# Patient Record
Sex: MALE | Race: WHITE | NOT HISPANIC OR LATINO | Employment: UNEMPLOYED | URBAN - NONMETROPOLITAN AREA
[De-identification: names, ages, dates, MRNs, and addresses within clinical notes are randomized per-mention and may not be internally consistent; named-entity substitution may affect disease eponyms.]

---
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
## 2022-03-03 ENCOUNTER — HOSPITAL ENCOUNTER (EMERGENCY)
Facility: HOSPITAL | Age: 22
Discharge: LEFT BEFORE DISCHARGE | End: 2022-03-03
Admitting: EMERGENCY MEDICINE

## 2022-03-03 VITALS
TEMPERATURE: 98.3 F | HEART RATE: 122 BPM | BODY MASS INDEX: 18.83 KG/M2 | SYSTOLIC BLOOD PRESSURE: 136 MMHG | DIASTOLIC BLOOD PRESSURE: 79 MMHG | OXYGEN SATURATION: 99 % | HEIGHT: 67 IN | RESPIRATION RATE: 20 BRPM | WEIGHT: 120 LBS

## 2022-03-03 PROCEDURE — 99281 EMR DPT VST MAYX REQ PHY/QHP: CPT

## 2022-03-04 ENCOUNTER — HOSPITAL ENCOUNTER (EMERGENCY)
Facility: HOSPITAL | Age: 22
Discharge: HOME | End: 2022-03-05
Attending: EMERGENCY MEDICINE | Admitting: EMERGENCY MEDICINE

## 2022-03-04 ENCOUNTER — HOSPITAL ENCOUNTER (EMERGENCY)
Dept: HOSPITAL 79 - ER1 | Age: 22
Discharge: HOME | End: 2022-03-04
Payer: COMMERCIAL

## 2022-03-04 DIAGNOSIS — R10.84: Primary | ICD-10-CM

## 2022-03-04 DIAGNOSIS — F12.10 CANNABIS ABUSE WITHOUT COMPLICATION: Primary | ICD-10-CM

## 2022-03-04 DIAGNOSIS — R11.2: ICD-10-CM

## 2022-03-04 LAB
BASOPHILS # BLD AUTO: 0.04 10*3/MM3 (ref 0–0.2)
BASOPHILS NFR BLD AUTO: 0.5 % (ref 0–1.5)
BUN/CREATININE RATIO: 10 (ref 0–10)
DEPRECATED RDW RBC AUTO: 39.5 FL (ref 37–54)
EOSINOPHIL # BLD AUTO: 0 10*3/MM3 (ref 0–0.4)
EOSINOPHIL NFR BLD AUTO: 0 % (ref 0.3–6.2)
ERYTHROCYTE [DISTWIDTH] IN BLOOD BY AUTOMATED COUNT: 11.6 % (ref 12.3–15.4)
HCT VFR BLD AUTO: 41.1 % (ref 37.5–51)
HGB BLD-MCNC: 13.9 G/DL (ref 13–17.7)
HGB BLD-MCNC: 14.8 GM/DL (ref 14–17.5)
IMM GRANULOCYTES # BLD AUTO: 0.02 10*3/MM3 (ref 0–0.05)
IMM GRANULOCYTES NFR BLD AUTO: 0.3 % (ref 0–0.5)
LYMPHOCYTES # BLD AUTO: 1.56 10*3/MM3 (ref 0.7–3.1)
LYMPHOCYTES NFR BLD AUTO: 20.5 % (ref 19.6–45.3)
MCH RBC QN AUTO: 31.1 PG (ref 26.6–33)
MCHC RBC AUTO-ENTMCNC: 33.8 G/DL (ref 31.5–35.7)
MCV RBC AUTO: 91.9 FL (ref 79–97)
MONOCYTES # BLD AUTO: 0.65 10*3/MM3 (ref 0.1–0.9)
MONOCYTES NFR BLD AUTO: 8.6 % (ref 5–12)
NEUTROPHILS NFR BLD AUTO: 5.33 10*3/MM3 (ref 1.7–7)
NEUTROPHILS NFR BLD AUTO: 70.1 % (ref 42.7–76)
NRBC BLD AUTO-RTO: 0 /100 WBC (ref 0–0.2)
PLATELET # BLD AUTO: 266 10*3/MM3 (ref 140–450)
PMV BLD AUTO: 10.6 FL (ref 6–12)
RBC # BLD AUTO: 4.47 10*6/MM3 (ref 4.14–5.8)
RED BLOOD COUNT: 4.79 M/UL (ref 4.2–5.5)
WBC NRBC COR # BLD: 7.6 10*3/MM3 (ref 3.4–10.8)
WHITE BLOOD COUNT: 7.4 K/UL (ref 4.5–11)

## 2022-03-04 PROCEDURE — 80053 COMPREHEN METABOLIC PANEL: CPT | Performed by: PHYSICIAN ASSISTANT

## 2022-03-04 PROCEDURE — 87636 SARSCOV2 & INF A&B AMP PRB: CPT | Performed by: PHYSICIAN ASSISTANT

## 2022-03-04 PROCEDURE — 83690 ASSAY OF LIPASE: CPT | Performed by: PHYSICIAN ASSISTANT

## 2022-03-04 PROCEDURE — 85025 COMPLETE CBC W/AUTO DIFF WBC: CPT | Performed by: PHYSICIAN ASSISTANT

## 2022-03-04 PROCEDURE — C9803 HOPD COVID-19 SPEC COLLECT: HCPCS

## 2022-03-04 PROCEDURE — 83605 ASSAY OF LACTIC ACID: CPT | Performed by: PHYSICIAN ASSISTANT

## 2022-03-04 PROCEDURE — 99283 EMERGENCY DEPT VISIT LOW MDM: CPT

## 2022-03-04 PROCEDURE — 86140 C-REACTIVE PROTEIN: CPT | Performed by: PHYSICIAN ASSISTANT

## 2022-03-04 PROCEDURE — 36415 COLL VENOUS BLD VENIPUNCTURE: CPT

## 2022-03-04 NOTE — ED NOTES
"Patient was assessed in lobby by [facility]. Upon explanation of plan of care with patient, patient advised provider he was not going to get his blood drawn as he was scared of needles. Provider explained process of blood draw and short duration of procedure. Patient stated \"no, I'll just leave and try again another time.\" Provider explained to patient that patient will need to have blood drawn to assess cause of pain upon future evaluation. Patient ambulated from facility and refused signing AMA form. [facility] CHRISTIE.     CC: Abdominal Pain  Arrival: 2243  Triage complete: 2248  Left AMA due to wanting labs drawn.      Martin Sheikh, RN  03/03/22 8050    "

## 2022-03-04 NOTE — ED NOTES
MEDICAL SCREENING:    Reason for Visit: Abdominal pain    Patient initially seen in triage.  The patient was advised further evaluation and diagnostic testing will be needed, some of the treatment and testing will be initiated in the lobby in order to begin the process.  The patient will be returned to the waiting area for the time being and possibly be re-assessed by a subsequent ED provider.  The patient will be brought back to the treatment area in as timely manner as possible.       Quita Sy, APRN  03/03/22 2503

## 2022-03-05 ENCOUNTER — APPOINTMENT (OUTPATIENT)
Dept: CT IMAGING | Facility: HOSPITAL | Age: 22
End: 2022-03-05

## 2022-03-05 VITALS
WEIGHT: 120 LBS | BODY MASS INDEX: 18.83 KG/M2 | TEMPERATURE: 97.9 F | SYSTOLIC BLOOD PRESSURE: 125 MMHG | HEART RATE: 62 BPM | OXYGEN SATURATION: 98 % | DIASTOLIC BLOOD PRESSURE: 81 MMHG | HEIGHT: 67 IN | RESPIRATION RATE: 20 BRPM

## 2022-03-05 LAB
ALBUMIN SERPL-MCNC: 4.84 G/DL (ref 3.5–5.2)
ALBUMIN/GLOB SERPL: 1.7 G/DL
ALP SERPL-CCNC: 44 U/L (ref 39–117)
ALT SERPL W P-5'-P-CCNC: 19 U/L (ref 1–41)
AMPHET+METHAMPHET UR QL: NEGATIVE
AMPHETAMINES UR QL: NEGATIVE
ANION GAP SERPL CALCULATED.3IONS-SCNC: 11.9 MMOL/L (ref 5–15)
AST SERPL-CCNC: 14 U/L (ref 1–40)
BARBITURATES UR QL SCN: NEGATIVE
BENZODIAZ UR QL SCN: NEGATIVE
BILIRUB SERPL-MCNC: 0.6 MG/DL (ref 0–1.2)
BILIRUB UR QL STRIP: NEGATIVE
BUN SERPL-MCNC: 6 MG/DL (ref 6–20)
BUN/CREAT SERPL: 7 (ref 7–25)
BUPRENORPHINE SERPL-MCNC: NEGATIVE NG/ML
CALCIUM SPEC-SCNC: 9.8 MG/DL (ref 8.6–10.5)
CANNABINOIDS SERPL QL: POSITIVE
CHLORIDE SERPL-SCNC: 101 MMOL/L (ref 98–107)
CLARITY UR: CLEAR
CO2 SERPL-SCNC: 26.1 MMOL/L (ref 22–29)
COCAINE UR QL: NEGATIVE
COLOR UR: YELLOW
CREAT SERPL-MCNC: 0.86 MG/DL (ref 0.76–1.27)
CRP SERPL-MCNC: 3.7 MG/DL (ref 0–0.5)
D-LACTATE SERPL-SCNC: 1.1 MMOL/L (ref 0.5–2)
EGFRCR SERPLBLD CKD-EPI 2021: 127.9 ML/MIN/1.73
FLUAV RNA RESP QL NAA+PROBE: NOT DETECTED
FLUBV RNA RESP QL NAA+PROBE: NOT DETECTED
GLOBULIN UR ELPH-MCNC: 2.9 GM/DL
GLUCOSE SERPL-MCNC: 109 MG/DL (ref 65–99)
GLUCOSE UR STRIP-MCNC: NEGATIVE MG/DL
HGB UR QL STRIP.AUTO: NEGATIVE
HOLD SPECIMEN: NORMAL
HOLD SPECIMEN: NORMAL
KETONES UR QL STRIP: NEGATIVE
LEUKOCYTE ESTERASE UR QL STRIP.AUTO: NEGATIVE
LIPASE SERPL-CCNC: 12 U/L (ref 13–60)
METHADONE UR QL SCN: NEGATIVE
NITRITE UR QL STRIP: NEGATIVE
OPIATES UR QL: NEGATIVE
OXYCODONE UR QL SCN: NEGATIVE
PCP UR QL SCN: NEGATIVE
PH UR STRIP.AUTO: 7 [PH] (ref 5–8)
POTASSIUM SERPL-SCNC: 3.9 MMOL/L (ref 3.5–5.2)
PROPOXYPH UR QL: NEGATIVE
PROT SERPL-MCNC: 7.7 G/DL (ref 6–8.5)
PROT UR QL STRIP: NEGATIVE
SARS-COV-2 RNA RESP QL NAA+PROBE: NOT DETECTED
SODIUM SERPL-SCNC: 139 MMOL/L (ref 136–145)
SP GR UR STRIP: 1.01 (ref 1–1.03)
TRICYCLICS UR QL SCN: NEGATIVE
UROBILINOGEN UR QL STRIP: NORMAL
WHOLE BLOOD HOLD SPECIMEN: NORMAL
WHOLE BLOOD HOLD SPECIMEN: NORMAL

## 2022-03-05 PROCEDURE — 81003 URINALYSIS AUTO W/O SCOPE: CPT | Performed by: PHYSICIAN ASSISTANT

## 2022-03-05 PROCEDURE — 80306 DRUG TEST PRSMV INSTRMNT: CPT | Performed by: PHYSICIAN ASSISTANT

## 2022-03-05 PROCEDURE — 74176 CT ABD & PELVIS W/O CONTRAST: CPT

## 2022-03-05 RX ORDER — PROMETHAZINE HYDROCHLORIDE 25 MG/1
25 TABLET ORAL EVERY 6 HOURS PRN
Qty: 20 TABLET | Refills: 0 | Status: SHIPPED | OUTPATIENT
Start: 2022-03-05 | End: 2022-03-10

## 2022-03-05 NOTE — ED NOTES
MEDICAL SCREENING:    Reason for Visit: Abdominal pain, n/v x 4 days    Patient initially seen in triage.  The patient was advised further evaluation and diagnostic testing will be needed, some of the treatment and testing will be initiated in the lobby in order to begin the process.  The patient will be returned to the waiting area for the time being and possibly be re-assessed by a subsequent ED provider.  The patient will be brought back to the treatment area in as timely manner as possible.         Ivy Mantilla PA-C  03/04/22 4525

## 2022-03-05 NOTE — ED NOTES
Pt reassessed at this time, pt has no new complaints. Apologized to pt for wait times. Pt has NADN at this time. Will continue to monitor pt.        Cynthia Taylor RN  03/05/22 0459

## 2022-03-05 NOTE — ED NOTES
Pt reassessed at this time, pt has no new complaints. Apologized to pt for wait times. Pt has NADN at this time. Will continue to monitor pt.        Cynthia Taylor RN  03/05/22 0052

## 2022-03-05 NOTE — ED NOTES
Pt reassessed at this time, pt has no new complaints. Apologized to pt for wait times. Pt has NADN at this time. Will continue to monitor pt.        Cynthia Taylor RN  03/05/22 0689

## 2022-03-05 NOTE — EXTERNAL PATIENT INSTRUCTIONS
Patient Education   Table of Contents       Cannabinoid Hyperemesis Syndrome       Substance Use Disorder     To view videos and all your education online visit,   [email or web address]   or scan this QR code with your smartphone.                  Cannabinoid Hyperemesis Syndrome     Cannabinoid hyperemesis syndrome (CHS) is a condition that causes repeated nausea, vomiting, and abdominal pain after long-term (chronic) use of marijuana (cannabis). People with CHS typically use marijuana 3?5 times a day for many years before they have symptoms, although it is possible to develop CHS with far less daily use.   Symptoms of CHS may be mild at first but can get worse and more frequent. In some cases, CHS may cause severe daily vomiting, which can lead to weight loss and dehydration.   What are the causes?   The exact cause of this condition is not known. Long-term use of marijuana may over-stimulate certain proteins in the brain and gastrointestinal tract that react with chemicals in marijuana (cannabinoid receptors). This over-stimulation may cause CHS.   What are the signs or symptoms?    Symptoms of this condition are often mild during the first few episodes, but they can get worse over time. Symptoms may include:       Frequent nausea, especially early in the morning.       Vomiting. This can become severe.       Abdominal pain.       Feeling very tired (lethargic).       Headaches.     CHS may go away and come back many times (recur). People may not have symptoms or may otherwise be healthy in between CHS episodes. Taking hot showers can relieve the symptoms of CHS, so feeling the need to take several hot showers throughout the day can be a sign of this condition.   How is this diagnosed?    This condition may be diagnosed based on:       Your symptoms and medical history, including any drug use.       A physical exam.      You may have tests done to rule out other problems that could cause your symptoms.  These tests may include:       Blood tests.       Urine tests.       Imaging tests, such as an X-ray or a CT scan.     How is this treated?    Treatment for this condition involves stopping marijuana use. Treatment may include:       A drug rehabilitation program, if you have trouble stopping marijuana use.       Medicines for nausea. These may be given at the hospital through an IV inserted into one of your veins, or they may be medicines that you take by mouth (orally).       Certain creams that contain a substance called capsaicin. These may improve symptoms when applied to the abdomen.       Hot showers to help relieve symptoms.     In severe cases, you may need treatment at a hospital. You may be given IV fluids to prevent or treat dehydration as well as medicines to treat nausea, vomiting, and pain.   Follow these instructions at home:   During an episode of CHS            Stay in bed and rest in a dark, quiet room.       Take anti-nausea medicine as told by your health care provider.       Try taking hot showers to relieve your symptoms.     After an episode of CHS         Drink small amounts of clear fluids slowly. Gradually add more if you can keep the fluids down without vomiting.       Once you are able to eat without vomiting, eat soft foods in small amounts every 3?4 hours.     General instructions           Do not  use any products that contain marijuana.If you need help quitting, ask your health care provider for resources and treatment options.       Drink enough fluid to keep your urine pale yellow. Avoid drinking fluids that have a lot of sugar or caffeine, such as coffee and soda.       Take and apply over-the-counter and prescription medicines only as told by your health care provider. Ask your health care provider before starting any new medicines or treatments.       Keep all follow-up visits as told by your health care provider. This is important. This includes any recommended substance abuse  programs.         Contact a health care provider if:         Your symptoms get worse.       You cannot drink fluids without vomiting or severe pain.       You have pain and trouble swallowing after an episode.     Get help right away if you:         Cannot stop vomiting.       Have blood in your vomit or your vomit looks like coffee grounds.       Have severe abdominal pain.       Have stools that are bloody or black, or stools that look like tar.      Have symptoms of dehydration, such as:       Sunken eyes.       Inability to make tears.       Cracked lips.       Dry mouth.       Decreased urine production.       Weakness.       Sleepiness.       Dizziness, light-headedness, or fainting.     Summary         Cannabinoid hyperemesis syndrome (CHS) is a condition that causes repeated nausea, vomiting, and abdominal pain after long-term use of marijuana.       People with CHS typically use marijuana 3?5 times a day for many years before they have symptoms, although it is possible to develop CHS with much less daily use.       Treatment for this condition involves stopping marijuana use. Hot showers and capsaicin creams may also help relieve symptoms. Ask your health care provider before starting any medicines or other treatments.       Your health care provider may prescribe medicines to help with nausea.       Get help right away if you have signs of dehydration, such as dry mouth, decreased urine production, weakness, dizziness, and light-headedness.     This information is not intended to replace advice given to you by your health care provider. Make sure you discuss any questions you have with your health care provider.     Document Released: 03/28/2018Document Revised: 10/14/2020Document Reviewed: 10/14/2020     [email or web address] Patient Education ? 2021 [email or web address]

## 2022-03-07 NOTE — ED PROVIDER NOTES
Subjective   Patient is a 21-year-old well male with no past medical history complaints of diffuse abdominal pain and nausea.  Patient states that 2 weeks ago he started having diffuse abdominal pain and intense nausea without vomiting.  Patient denies any additional symptoms today.  Patient denies fever cough shortness of breath chest pain diarrhea constipation.      History provided by:  Patient   used: No        Review of Systems   Constitutional: Negative.  Negative for fever.   HENT: Negative.    Respiratory: Negative.    Cardiovascular: Negative.  Negative for chest pain.   Gastrointestinal: Positive for abdominal pain and nausea.   Endocrine: Negative.    Genitourinary: Negative.  Negative for dysuria.   Skin: Negative.    Neurological: Negative.    Psychiatric/Behavioral: Negative.    All other systems reviewed and are negative.      No past medical history on file.    No Known Allergies    No past surgical history on file.    No family history on file.    Social History     Socioeconomic History   • Marital status: Single   Tobacco Use   • Smoking status: Never Smoker   • Smokeless tobacco: Never Used           Objective   Physical Exam  Vitals and nursing note reviewed.   Constitutional:       General: He is not in acute distress.     Appearance: He is well-developed. He is not diaphoretic.   HENT:      Head: Normocephalic and atraumatic.      Right Ear: External ear normal.      Left Ear: External ear normal.      Nose: Nose normal.   Eyes:      Conjunctiva/sclera: Conjunctivae normal.      Pupils: Pupils are equal, round, and reactive to light.   Neck:      Vascular: No JVD.      Trachea: No tracheal deviation.   Cardiovascular:      Rate and Rhythm: Normal rate and regular rhythm.      Heart sounds: Normal heart sounds. No murmur heard.  Pulmonary:      Effort: Pulmonary effort is normal. No respiratory distress.      Breath sounds: Normal breath sounds. No wheezing.   Abdominal:       General: Bowel sounds are normal.      Palpations: Abdomen is soft.      Tenderness: There is no abdominal tenderness.   Musculoskeletal:         General: No deformity. Normal range of motion.      Cervical back: Normal range of motion and neck supple.   Skin:     General: Skin is warm and dry.      Coloration: Skin is not pale.      Findings: No erythema or rash.   Neurological:      Mental Status: He is alert and oriented to person, place, and time.      Cranial Nerves: No cranial nerve deficit.   Psychiatric:         Behavior: Behavior normal.         Thought Content: Thought content normal.         Procedures           ED Course  ED Course as of 03/07/22 0009   Sat Mar 05, 2022   0650 CT Abdomen Pelvis Without Contrast  1. Abnormal area of what appears to be inflammation adjacent to the tip of the cecum. This appears separate from a normal appendix and is not clearly arising from the terminal ileum. This is nonspecific. This could potentially reflect an inflamed cecal  diverticulum. A soft tissue mass is felt to be less likely in a patient of this age. Continued follow-up is recommended.  2. Trace amount of dependent free fluid in the pelvis, likely reactive.  3. Otherwise negative allowing for the lack of contrast. [SM]   0700 Work up and results were discussed throughly with the patients.  The patient will be discharged for further monitoring and management with their PCP.  Red flags, warning signs, worsening symptoms, and when to return to the ER discussed with and understood by the patients.  Patient will follow up with their PCP in a timely manner.  Vitals stable at discharge.  [SM]   0700 This care is provided during an unprecedented national emergency due to the Novel Coronavirus (COVID-19). COVID-19 infections and transmission risks place heavy strains on healthcare resources. As this pandemic evolves, the Hospital and providers strive to respond fluidly, to remain operational, and to provide care  relative to available resources and information. Outcomes are unpredictable and treatments are without well-defined guidelines. Further, the impact of COVID-19 on all aspects of emergency care, including the impact to patients seeking care for reasons other than COVID-19, is unavoidable during this national emergency.    This note was dictated using a [id number] tool. Occasional wrong-word or 'sound-a-like' substitutions may have occurred due to the inherent limitations of voice recognition software.  Read the chart carefully and recognize, using context, where substitutions have occurred.  [SM]      ED Course User Index  [SM] Monica Maldonado, APRN                                                 [facility]    Final diagnoses:   Cannabis abuse without complication       ED Disposition  ED Disposition     ED Disposition   Discharge    Condition   Stable    Comment   --             PATIENT CONNECTION - [place]  See Provider List  [place] [address]  [phone]  Schedule an appointment as soon as possible for a visit in 2 days      [facility] [address] [place] [id number] [phone]  Schedule an appointment as soon as possible for a visit in 2 days           Medication List      New Prescriptions    promethazine 25 MG tablet  Commonly known as: PHENERGAN  Take 1 tablet by mouth Every 6 (Six) Hours As Needed for Nausea or Vomiting for up to 5 days.           Where to Get Your Medications      These medications were sent to [id number] - [place], [place] - [address] AT [facility] - [phone]  - [phone]   [address], [place] [address]    Phone: [phone]   · promethazine 25 MG tablet          Monica Maldonado, APRN  03/07/22 0009

## 2022-04-12 ENCOUNTER — TRANSCRIBE ORDERS (OUTPATIENT)
Dept: ADMINISTRATIVE | Facility: HOSPITAL | Age: 22
End: 2022-04-12

## 2022-04-12 DIAGNOSIS — R10.9 STOMACH ACHE: Primary | ICD-10-CM

## 2022-04-14 ENCOUNTER — HOSPITAL ENCOUNTER (EMERGENCY)
Facility: HOSPITAL | Age: 22
Discharge: HOME | End: 2022-04-14
Attending: EMERGENCY MEDICINE | Admitting: EMERGENCY MEDICINE

## 2022-04-14 VITALS
BODY MASS INDEX: 17.74 KG/M2 | WEIGHT: 113 LBS | SYSTOLIC BLOOD PRESSURE: 124 MMHG | HEIGHT: 67 IN | DIASTOLIC BLOOD PRESSURE: 77 MMHG | TEMPERATURE: 97.7 F | HEART RATE: 90 BPM | OXYGEN SATURATION: 99 % | RESPIRATION RATE: 18 BRPM

## 2022-04-14 DIAGNOSIS — K21.9 GASTROESOPHAGEAL REFLUX DISEASE, UNSPECIFIED WHETHER ESOPHAGITIS PRESENT: Primary | ICD-10-CM

## 2022-04-14 LAB
HOLD SPECIMEN: NORMAL
HOLD SPECIMEN: NORMAL
WHOLE BLOOD HOLD SPECIMEN: NORMAL
WHOLE BLOOD HOLD SPECIMEN: NORMAL

## 2022-04-14 PROCEDURE — 93005 ELECTROCARDIOGRAM TRACING: CPT

## 2022-04-14 PROCEDURE — 93005 ELECTROCARDIOGRAM TRACING: CPT | Performed by: EMERGENCY MEDICINE

## 2022-04-14 PROCEDURE — 99283 EMERGENCY DEPT VISIT LOW MDM: CPT

## 2022-04-14 RX ORDER — PHENOBARBITAL, HYOSCYAMINE SULFATE, ATROPINE SULFATE AND SCOPOLAMINE HYDROBROMIDE .0194; .1037; 16.2; .0065 MG/1; MG/1; MG/1; MG/1
2 TABLET ORAL ONCE
Status: COMPLETED | OUTPATIENT
Start: 2022-04-14 | End: 2022-04-14

## 2022-04-14 RX ORDER — ALUMINA, MAGNESIA, AND SIMETHICONE 2400; 2400; 240 MG/30ML; MG/30ML; MG/30ML
15 SUSPENSION ORAL ONCE
Status: COMPLETED | OUTPATIENT
Start: 2022-04-14 | End: 2022-04-14

## 2022-04-14 RX ORDER — PANTOPRAZOLE SODIUM 40 MG/1
40 TABLET, DELAYED RELEASE ORAL DAILY
COMMUNITY
End: 2022-04-26

## 2022-04-14 RX ORDER — LIDOCAINE HYDROCHLORIDE 20 MG/ML
15 SOLUTION OROPHARYNGEAL ONCE
Status: COMPLETED | OUTPATIENT
Start: 2022-04-14 | End: 2022-04-14

## 2022-04-14 RX ADMIN — PHENOBARBITAL, HYOSCYAMINE SULFATE, ATROPINE SULFATE, SCOPOLAMINE HYDROBROMIDE 32.4 MG: 16.2; .1037; .0194; .0065 TABLET ORAL at 21:51

## 2022-04-14 RX ADMIN — LIDOCAINE HYDROCHLORIDE 15 ML: 20 SOLUTION ORAL; TOPICAL at 21:51

## 2022-04-14 RX ADMIN — ALUMINUM HYDROXIDE, MAGNESIUM HYDROXIDE, AND DIMETHICONE 15 ML: 400; 400; 40 SUSPENSION ORAL at 21:51

## 2022-04-15 LAB
QT INTERVAL: 274 MS
QTC INTERVAL: 392 MS

## 2022-04-15 NOTE — ED PROVIDER NOTES
Subjective   21-year-old male with no past medical history besides GERD and vaping and marijuana use who comes in with epigastric pain to substernal pain that radiated to the left shoulder which was sharp in nature and burning, tried pantoprazole.  States that when this started he was sitting in the chair took the pantoprazole then laid down in bed pain got worse while laying in bed so he came here for further evaluation.  No family history of sudden cardiac death or heart disease.  He has no other history of hypertension hyperlipidemia or diabetes.          Review of Systems    Past Medical History:   Diagnosis Date   • GERD (gastroesophageal reflux disease)        No Known Allergies    History reviewed. No pertinent surgical history.    Family History   Problem Relation Age of Onset   • Diabetes Maternal Aunt    • Lung cancer Maternal Grandmother        Social History     Socioeconomic History   • Marital status: Single   Tobacco Use   • Smoking status: Never Smoker   • Smokeless tobacco: Never Used   Vaping Use   • Vaping Use: Every day   Substance and Sexual Activity   • Alcohol use: Never   • Drug use: Yes     Types: Marijuana   • Sexual activity: Defer           Objective   Physical Exam  Vitals and nursing note reviewed. Exam conducted with a chaperone present.   Constitutional:       General: He is not in acute distress.     Appearance: He is well-developed. He is not ill-appearing or toxic-appearing.   HENT:      Head: Normocephalic and atraumatic.      Mouth/Throat:      Mouth: Mucous membranes are moist.      Pharynx: Oropharynx is clear.   Eyes:      Extraocular Movements: Extraocular movements intact.      Pupils: Pupils are equal, round, and reactive to light.   Cardiovascular:      Rate and Rhythm: Normal rate and regular rhythm.      Heart sounds: Normal heart sounds. No murmur heard.    No friction rub. No gallop.   Pulmonary:      Effort: Pulmonary effort is normal.   Abdominal:      General:  Abdomen is flat. Bowel sounds are normal. There is no distension or abdominal bruit.      Palpations: Abdomen is soft. There is no fluid wave, hepatomegaly, splenomegaly or mass.      Tenderness: There is abdominal tenderness (epigastric). There is no guarding or rebound.      Hernia: No hernia is present.   Skin:     General: Skin is dry.      Capillary Refill: Capillary refill takes less than 2 seconds.      Coloration: Skin is not cyanotic.      Findings: No rash.   Neurological:      General: No focal deficit present.      Mental Status: He is alert.   Psychiatric:         Mood and Affect: Mood normal.         Behavior: Behavior normal.         Procedures           ED Course  ED Course as of 05/17/22 0015   u Apr 14, 2022 2111 EKG sinus tach borderline LVH in V leads, no ST segment elevation or depression no T wave inversion.  No signs of Wellens Brugada WPW or QTC prolongation [JM]   2207 Patient feels better after GI cocktail.  Will discharge home, patient is already on pantoprazole at home, no need for additional medication at this time.  He will follow up with his doctor. [JM]      ED Course User Index  [] Gilberto Mills MD                                                 [facility]    Final diagnoses:   Gastroesophageal reflux disease, unspecified whether esophagitis present       ED Disposition  ED Disposition     ED Disposition   Discharge    Condition   Stable    Comment   --             Martin Liang PA  [address] [place] [address]  [phone]    Schedule an appointment as soon as possible for a visit       [facility] Emergency Department  [address] [place] [id number]  [phone]  Go to   If symptoms worsen         Medication List      No changes were made to your prescriptions during this visit.          Gilberto Mills MD  04/14/22 2208       Gilberto Mills MD  05/17/22 0015

## 2022-04-15 NOTE — DISCHARGE INSTRUCTIONS
Keep taking the medication your primary doctor prescribed.  Come back to the ER soon as possible if any worsens.

## 2022-04-17 ENCOUNTER — HOSPITAL ENCOUNTER (EMERGENCY)
Dept: HOSPITAL 79 - ER1 | Age: 22
Discharge: HOME | End: 2022-04-17
Payer: COMMERCIAL

## 2022-04-17 DIAGNOSIS — F17.290: ICD-10-CM

## 2022-04-17 DIAGNOSIS — R07.89: Primary | ICD-10-CM

## 2022-04-17 LAB
BUN/CREATININE RATIO: 14 (ref 0–10)
HGB BLD-MCNC: 15.4 GM/DL (ref 14–17.5)
RED BLOOD COUNT: 4.87 M/UL (ref 4.2–5.5)
WHITE BLOOD COUNT: 10.4 K/UL (ref 4.5–11)

## 2022-04-26 ENCOUNTER — OFFICE VISIT (OUTPATIENT)
Dept: GASTROENTEROLOGY | Facility: CLINIC | Age: 22
End: 2022-04-26

## 2022-04-26 VITALS
WEIGHT: 110.8 LBS | BODY MASS INDEX: 17.39 KG/M2 | SYSTOLIC BLOOD PRESSURE: 119 MMHG | HEART RATE: 102 BPM | HEIGHT: 67 IN | DIASTOLIC BLOOD PRESSURE: 76 MMHG

## 2022-04-26 DIAGNOSIS — M25.512 ACUTE PAIN OF LEFT SHOULDER: Primary | ICD-10-CM

## 2022-04-26 DIAGNOSIS — K21.9 GASTROESOPHAGEAL REFLUX DISEASE WITHOUT ESOPHAGITIS: ICD-10-CM

## 2022-04-26 PROCEDURE — 99204 OFFICE O/P NEW MOD 45 MIN: CPT | Performed by: PHYSICIAN ASSISTANT

## 2022-04-26 RX ORDER — OMEPRAZOLE 40 MG/1
40 CAPSULE, DELAYED RELEASE ORAL 2 TIMES DAILY
Qty: 60 CAPSULE | Refills: 11 | Status: SHIPPED | OUTPATIENT
Start: 2022-04-26 | End: 2022-06-30

## 2022-04-26 RX ORDER — HYDROXYZINE HYDROCHLORIDE 25 MG/1
25 TABLET, FILM COATED ORAL 3 TIMES DAILY PRN
COMMUNITY

## 2022-04-26 RX ORDER — CYCLOBENZAPRINE HCL 10 MG
10 TABLET ORAL NIGHTLY
Qty: 30 TABLET | Refills: 11 | Status: SHIPPED | OUTPATIENT
Start: 2022-04-26

## 2022-04-26 RX ORDER — PSEUDOEPHED/ACETAMINOPH/DIPHEN 30MG-500MG
TABLET ORAL
COMMUNITY
Start: 2022-04-18

## 2022-04-27 ENCOUNTER — APPOINTMENT (OUTPATIENT)
Dept: ULTRASOUND IMAGING | Facility: HOSPITAL | Age: 22
End: 2022-04-27

## 2022-04-28 ENCOUNTER — TELEPHONE (OUTPATIENT)
Dept: GASTROENTEROLOGY | Facility: CLINIC | Age: 22
End: 2022-04-28

## 2022-04-28 NOTE — TELEPHONE ENCOUNTER
Patient called stating the Linzess is not helping and still has not used the bathroom can he get something else to help him

## 2022-05-16 DIAGNOSIS — K58.1 IRRITABLE BOWEL SYNDROME WITH CONSTIPATION: Primary | ICD-10-CM

## 2022-05-24 ENCOUNTER — OFFICE VISIT (OUTPATIENT)
Dept: GASTROENTEROLOGY | Facility: CLINIC | Age: 22
End: 2022-05-24

## 2022-05-24 VITALS
WEIGHT: 114.2 LBS | BODY MASS INDEX: 17.92 KG/M2 | HEART RATE: 119 BPM | HEIGHT: 67 IN | SYSTOLIC BLOOD PRESSURE: 125 MMHG | DIASTOLIC BLOOD PRESSURE: 84 MMHG

## 2022-05-24 DIAGNOSIS — K21.9 GASTROESOPHAGEAL REFLUX DISEASE WITHOUT ESOPHAGITIS: ICD-10-CM

## 2022-05-24 DIAGNOSIS — K58.1 IRRITABLE BOWEL SYNDROME WITH CONSTIPATION: Primary | ICD-10-CM

## 2022-05-24 PROCEDURE — 99214 OFFICE O/P EST MOD 30 MIN: CPT | Performed by: PHYSICIAN ASSISTANT

## 2022-05-24 NOTE — PROGRESS NOTES
Chief Complaint   Patient presents with   • Heartburn   • Constipation       Servando Kulkarni is a 21 y.o. male who presents to the office today for evaluation of Heartburn and Constipation  .    HPI  Patient presents the clinic today for follow-up of constipation and heartburn/reflux symptoms.  He was last seen in clinic roughly 1 month ago in which she was given samples of Linzess 145 mcg as well as started on omeprazole 40 mg once daily.  Patient states that originally the Linzess 145 mcg did not seem to be working well-however the 290 dose has been helping to produce a bowel movement which has helped with some of his abdominal pain/bloating.  Patient states that the Linzess does produce a bowel movement within a few hours of taking and will range from type IV-V on the [place] stool scale.  Bowel movements do tend to occur with urgency however.  Patient states that he is modified his diet enough to where he is no longer having to take the omeprazole for heartburn/reflux symptoms.  Patient states that he is doing somewhat better than he was when he was last seen in clinic.  He continues to have some pain in the left shoulder/pec area with certain movements-he is only taking the Flexeril as needed.    Review of Systems   Constitutional: Negative for fever.   HENT: Negative for trouble swallowing.    Eyes: Negative.    Respiratory: Negative.    Cardiovascular: Negative for chest pain.   Gastrointestinal: Positive for diarrhea. Negative for abdominal distention, abdominal pain, anal bleeding, blood in stool, constipation, nausea and vomiting.   Endocrine: Negative.    Genitourinary: Negative for difficulty urinating.   Musculoskeletal: Positive for back pain.   Skin: Negative.    Allergic/Immunologic: Negative.    Neurological: Positive for headaches.   Hematological: Does not bruise/bleed easily.       ACTIVE PROBLEMS:   Specialty Problems    None         PAST MEDICAL HISTORY:  Past Medical History:   Diagnosis  "Date   • GERD (gastroesophageal reflux disease)        SURGICAL HISTORY:  History reviewed. No pertinent surgical history.    FAMILY HISTORY:  Family History   Problem Relation Age of Onset   • Diabetes Maternal Aunt    • Lung cancer Maternal Grandmother        SOCIAL HISTORY:  Social History     Tobacco Use   • Smoking status: Never Smoker   • Smokeless tobacco: Never Used   Substance Use Topics   • Alcohol use: Never       CURRENT MEDICATION:    Current Outpatient Medications:   •  Acetaminophen Extra Strength 500 MG tablet, TAKE 1 OR 2 TABLETS BY MOUTH EVERY 4 TO 6 HOURS AS NEEDED **DO NOT EXCEED 8 TABLETS PER 24 HOURS**, Disp: , Rfl:   •  cyclobenzaprine (FLEXERIL) 10 MG tablet, Take 1 tablet by mouth Every Night., Disp: 30 tablet, Rfl: 11  •  docusate sodium (COLACE) 50 MG capsule, Take  by mouth 2 (Two) Times a Day., Disp: , Rfl:   •  hydrOXYzine (ATARAX) 25 MG tablet, Take 25 mg by mouth 3 (Three) Times a Day As Needed for Itching., Disp: , Rfl:   •  linaclotide (LINZESS) 290 MCG capsule capsule, Take 1 capsule by mouth Every Morning Before Breakfast., Disp: 30 capsule, Rfl: 11  •  omeprazole (priLOSEC) 40 MG capsule, Take 1 capsule by mouth 2 (Two) Times a Day. 30 mins before breakfast and 2 hours before bed, Disp: 60 capsule, Rfl: 11    ALLERGIES:  Patient has no known allergies.    VISIT VITALS:  /84 (BP Location: Left arm, Patient Position: Sitting, Cuff Size: Adult)   Pulse 119   Ht 170.2 cm (67\")   Wt 51.8 kg (114 lb 3.2 oz)   BMI 17.89 kg/m²   Physical Exam  Constitutional:       General: He is not in acute distress.     Appearance: Normal appearance. He is well-developed.   HENT:      Head: Normocephalic and atraumatic.   Eyes:      Pupils: Pupils are equal, round, and reactive to light.   Cardiovascular:      Rate and Rhythm: Normal rate and regular rhythm.      Heart sounds: Normal heart sounds.   Pulmonary:      Effort: Pulmonary effort is normal. No respiratory distress.      Breath " sounds: Normal breath sounds. No wheezing, rhonchi or rales.   Abdominal:      General: Abdomen is flat. Bowel sounds are normal. There is no distension.      Palpations: Abdomen is soft. There is no mass.      Tenderness: There is no abdominal tenderness. There is no guarding or rebound.      Hernia: No hernia is present.   Musculoskeletal:         General: No swelling. Normal range of motion.      Cervical back: Normal range of motion and neck supple.      Right lower leg: No edema.      Left lower leg: No edema.   Skin:     General: Skin is warm and dry.   Neurological:      Mental Status: He is alert and oriented to person, place, and time.   Psychiatric:         Attention and Perception: Attention normal.         Mood and Affect: Mood normal.         Speech: Speech normal.         Behavior: Behavior normal. Behavior is cooperative.         Thought Content: Thought content normal.         Assessment    Due to the patient's symptoms-she will stay on his current medication regimen of Linzess 290 mcg once daily.  He has discontinue use of omeprazole 40 mg as he is able to control symptoms now with diet modifications.  He will return in 4 weeks to see if symptoms continue to improve.   Diagnosis Plan   1. Irritable bowel syndrome with constipation     2. Gastroesophageal reflux disease without esophagitis         Return in about 4 weeks (around 6/21/2022).                   This document has been electronically signed by Varsha Ulrich PA-C  May 29, 2022 21:18 EDT    Part of this note may be an electronic transcription/translation of spoken language to printed text using the Dragon Dictation System.

## 2022-06-30 ENCOUNTER — OFFICE VISIT (OUTPATIENT)
Dept: GASTROENTEROLOGY | Facility: CLINIC | Age: 22
End: 2022-06-30

## 2022-06-30 VITALS
HEIGHT: 67 IN | BODY MASS INDEX: 18.58 KG/M2 | DIASTOLIC BLOOD PRESSURE: 68 MMHG | SYSTOLIC BLOOD PRESSURE: 104 MMHG | HEART RATE: 85 BPM | WEIGHT: 118.4 LBS

## 2022-06-30 DIAGNOSIS — K58.1 IRRITABLE BOWEL SYNDROME WITH CONSTIPATION: ICD-10-CM

## 2022-06-30 PROCEDURE — 99213 OFFICE O/P EST LOW 20 MIN: CPT | Performed by: PHYSICIAN ASSISTANT

## 2022-06-30 NOTE — PROGRESS NOTES
Chief Complaint   Patient presents with   • Constipation       Servando Kulkarni is a 21 y.o. male who presents to the office today for evaluation of Constipation  .    HPI  Patient presents the clinic today for follow-up of chronic constipation.  He states that he has been doing very well since he was seen in clinic back in May.  Patient states that the Linzess 290 mcg once daily is working to control all of his constipation.  He takes medication once daily for single morning and will have a bowel movement not long after.  Bowel movements range from type IV-V on the [place] stool scale.  He is no longer experiencing any abdominal pain or bloating.    Review of Systems   Constitutional: Negative for fever.   HENT: Negative for trouble swallowing.    Eyes: Negative.    Respiratory: Negative.    Cardiovascular: Negative for chest pain.   Gastrointestinal: Negative for abdominal distention, abdominal pain, anal bleeding, blood in stool, constipation, diarrhea, nausea and vomiting.   Endocrine: Negative.    Genitourinary: Negative for difficulty urinating.   Musculoskeletal: Positive for back pain.   Skin: Negative.    Allergic/Immunologic: Negative.    Neurological: Positive for headaches.   Hematological: Does not bruise/bleed easily.       ACTIVE PROBLEMS:   Specialty Problems    None         PAST MEDICAL HISTORY:  Past Medical History:   Diagnosis Date   • GERD (gastroesophageal reflux disease)        SURGICAL HISTORY:  History reviewed. No pertinent surgical history.    FAMILY HISTORY:  Family History   Problem Relation Age of Onset   • Diabetes Maternal Aunt    • Lung cancer Maternal Grandmother        SOCIAL HISTORY:  Social History     Tobacco Use   • Smoking status: Never Smoker   • Smokeless tobacco: Never Used   Substance Use Topics   • Alcohol use: Never       CURRENT MEDICATION:    Current Outpatient Medications:   •  Acetaminophen Extra Strength 500 MG tablet, TAKE 1 OR 2 TABLETS BY MOUTH EVERY 4 TO 6  "HOURS AS NEEDED **DO NOT EXCEED 8 TABLETS PER 24 HOURS**, Disp: , Rfl:   •  cyclobenzaprine (FLEXERIL) 10 MG tablet, Take 1 tablet by mouth Every Night., Disp: 30 tablet, Rfl: 11  •  docusate sodium (COLACE) 50 MG capsule, Take  by mouth 2 (Two) Times a Day., Disp: , Rfl:   •  hydrOXYzine (ATARAX) 25 MG tablet, Take 25 mg by mouth 3 (Three) Times a Day As Needed for Itching., Disp: , Rfl:   •  linaclotide (LINZESS) 290 MCG capsule capsule, Take 1 capsule by mouth Every Morning Before Breakfast., Disp: 30 capsule, Rfl: 11    ALLERGIES:  Patient has no known allergies.    VISIT VITALS:  /68 (BP Location: Left arm, Patient Position: Sitting, Cuff Size: Adult)   Pulse 85   Ht 170.2 cm (67\")   Wt 53.7 kg (118 lb 6.4 oz)   BMI 18.54 kg/m²   Physical Exam  Constitutional:       General: He is not in acute distress.     Appearance: Normal appearance. He is well-developed.   HENT:      Head: Normocephalic and atraumatic.   Eyes:      Pupils: Pupils are equal, round, and reactive to light.   Cardiovascular:      Rate and Rhythm: Normal rate and regular rhythm.      Heart sounds: Normal heart sounds.   Pulmonary:      Effort: Pulmonary effort is normal. No respiratory distress.      Breath sounds: Normal breath sounds. No wheezing, rhonchi or rales.   Abdominal:      General: Abdomen is flat. Bowel sounds are normal. There is no distension.      Palpations: Abdomen is soft. There is no mass.      Tenderness: There is no abdominal tenderness. There is no guarding or rebound.      Hernia: No hernia is present.   Musculoskeletal:         General: No swelling. Normal range of motion.      Cervical back: Normal range of motion and neck supple.      Right lower leg: No edema.      Left lower leg: No edema.   Skin:     General: Skin is warm and dry.   Neurological:      Mental Status: He is alert and oriented to person, place, and time.   Psychiatric:         Attention and Perception: Attention normal.         Mood and " Affect: Mood normal.         Speech: Speech normal.         Behavior: Behavior normal. Behavior is cooperative.         Thought Content: Thought content normal.         Assessment    Patient will continue on his Linzess to 90 mcg once daily for chronic constipation.  He is doing well on the medication-was told to contact the clinic if medication seems to stop working at any point and we will look at other options.   Diagnosis Plan   1. Irritable bowel syndrome with constipation  linaclotide (LINZESS) 290 MCG capsule capsule       Return in about 1 year (around 6/30/2023).                   This document has been electronically signed by Varsha Early PA-C  June 30, 2022 16:00 EDT    Part of this note may be an electronic transcription/translation of spoken language to printed text using the Dragon Dictation System.

## 2022-08-09 ENCOUNTER — HOSPITAL ENCOUNTER (EMERGENCY)
Dept: HOSPITAL 79 - ER1 | Age: 22
Discharge: LEFT BEFORE DISCHARGE | End: 2022-08-09
Payer: COMMERCIAL

## 2022-08-09 DIAGNOSIS — Z53.21: Primary | ICD-10-CM

## 2022-08-09 PROCEDURE — 99282 EMERGENCY DEPT VISIT SF MDM: CPT

## 2022-08-10 ENCOUNTER — APPOINTMENT (OUTPATIENT)
Dept: CT IMAGING | Facility: HOSPITAL | Age: 22
End: 2022-08-10

## 2022-08-10 ENCOUNTER — HOSPITAL ENCOUNTER (EMERGENCY)
Facility: HOSPITAL | Age: 22
Discharge: HOME | End: 2022-08-10
Attending: STUDENT IN AN ORGANIZED HEALTH CARE EDUCATION/TRAINING PROGRAM

## 2022-08-10 VITALS
HEART RATE: 76 BPM | TEMPERATURE: 98.4 F | RESPIRATION RATE: 16 BRPM | OXYGEN SATURATION: 100 % | SYSTOLIC BLOOD PRESSURE: 112 MMHG | WEIGHT: 120 LBS | BODY MASS INDEX: 18.83 KG/M2 | DIASTOLIC BLOOD PRESSURE: 81 MMHG | HEIGHT: 67 IN

## 2022-08-10 DIAGNOSIS — S09.90XA INJURY OF HEAD, INITIAL ENCOUNTER: Primary | ICD-10-CM

## 2022-08-10 PROCEDURE — 70450 CT HEAD/BRAIN W/O DYE: CPT

## 2022-08-10 RX ORDER — PROCHLORPERAZINE MALEATE 10 MG
10 TABLET ORAL EVERY 6 HOURS PRN
Qty: 20 TABLET | Refills: 0 | Status: SHIPPED | OUTPATIENT
Start: 2022-08-10
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
## 2022-08-10 NOTE — ED NOTES
MEDICAL SCREENING:    Reason for Visit: head injury    Patient initially seen in triage.  The patient was advised further evaluation and diagnostic testing will be needed, some of the treatment and testing will be initiated in the lobby in order to begin the process.  The patient will be returned to the waiting area for the time being and possibly be re-assessed by a subsequent ED provider.  The patient will be brought back to the treatment area in as timely manner as possible.         Eliseo Cadena II, PA  08/09/22 6904

## 2023-02-28 ENCOUNTER — TELEPHONE (OUTPATIENT)
Dept: GASTROENTEROLOGY | Facility: CLINIC | Age: 23
End: 2023-02-28
Payer: COMMERCIAL

## 2023-02-28 DIAGNOSIS — K59.04 CHRONIC IDIOPATHIC CONSTIPATION: Primary | ICD-10-CM

## 2023-04-04 ENCOUNTER — HOSPITAL ENCOUNTER (EMERGENCY)
Facility: HOSPITAL | Age: 23
Discharge: HOME | End: 2023-04-04
Attending: STUDENT IN AN ORGANIZED HEALTH CARE EDUCATION/TRAINING PROGRAM | Admitting: STUDENT IN AN ORGANIZED HEALTH CARE EDUCATION/TRAINING PROGRAM
Payer: COMMERCIAL

## 2023-04-04 VITALS
RESPIRATION RATE: 18 BRPM | TEMPERATURE: 97.8 F | OXYGEN SATURATION: 99 % | HEART RATE: 86 BPM | HEIGHT: 67 IN | WEIGHT: 113 LBS | BODY MASS INDEX: 17.74 KG/M2 | SYSTOLIC BLOOD PRESSURE: 129 MMHG | DIASTOLIC BLOOD PRESSURE: 73 MMHG
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
## 2023-04-04 DIAGNOSIS — S30.862A TICK BITE OF PENIS, INITIAL ENCOUNTER: Primary | ICD-10-CM

## 2023-04-04 PROCEDURE — 99283 EMERGENCY DEPT VISIT LOW MDM: CPT

## 2023-04-04 RX ORDER — DOXYCYCLINE 100 MG/1
100 CAPSULE ORAL ONCE
Status: COMPLETED | OUTPATIENT
Start: 2023-04-04 | End: 2023-04-04

## 2023-04-04 RX ORDER — DOXYCYCLINE 100 MG/1
100 CAPSULE ORAL 2 TIMES DAILY
Qty: 14 CAPSULE | Refills: 0 | Status: SHIPPED | OUTPATIENT
Start: 2023-04-04

## 2023-04-04 RX ADMIN — TRIAMCINOLONE ACETONIDE 1 APPLICATION: 1 OINTMENT TOPICAL at 23:36

## 2023-04-04 RX ADMIN — DOXYCYCLINE 100 MG: 100 CAPSULE ORAL at 23:35

## 2023-04-05 NOTE — ED PROVIDER NOTES
Subjective     History provided by:  Patient  Insect Bite  Attacking animal: tick.  Location:  Pelvis  Pelvic injury location:  Penis  Time since incident:  2 days  Pain details:     Quality:  Sore    Severity:  Mild  Animal in possession: no (pt removed tick)    Relieved by:  Nothing  Associated symptoms: rash and swelling    Associated symptoms: no fever        Review of Systems   Constitutional: Negative.  Negative for fever.   HENT: Negative.    Respiratory: Negative.    Cardiovascular: Negative.  Negative for chest pain.   Gastrointestinal: Negative.  Negative for abdominal pain.   Endocrine: Negative.    Genitourinary: Positive for penile pain. Negative for dysuria.   Skin: Positive for rash.   Neurological: Negative.    Psychiatric/Behavioral: Negative.    All other systems reviewed and are negative.      Past Medical History:   Diagnosis Date   • GERD (gastroesophageal reflux disease)        No Known Allergies    History reviewed. No pertinent surgical history.    Family History   Problem Relation Age of Onset   • Diabetes Maternal Aunt    • Lung cancer Maternal Grandmother        Social History     Socioeconomic History   • Marital status: Single   Tobacco Use   • Smoking status: Never   • Smokeless tobacco: Never   Vaping Use   • Vaping Use: Every day   Substance and Sexual Activity   • Alcohol use: Never   • Drug use: Yes     Types: Marijuana   • Sexual activity: Defer           Objective   Physical Exam  Vitals and nursing note reviewed.   Constitutional:       General: He is not in acute distress.     Appearance: He is well-developed. He is not diaphoretic.   HENT:      Head: Normocephalic and atraumatic.      Right Ear: External ear normal.      Left Ear: External ear normal.      Nose: Nose normal.   Eyes:      Conjunctiva/sclera: Conjunctivae normal.   Neck:      Vascular: No JVD.      Trachea: No tracheal deviation.   Cardiovascular:      Rate and Rhythm: Normal rate.      Heart sounds: No murmur  heard.  Pulmonary:      Effort: Pulmonary effort is normal. No respiratory distress.      Breath sounds: No wheezing.   Abdominal:      Palpations: Abdomen is soft.      Tenderness: There is no abdominal tenderness.   Genitourinary:     Comments: Localized area of erythema and noted just inferior to the foreskin.  Musculoskeletal:         General: No deformity. Normal range of motion.      Cervical back: Normal range of motion and neck supple.   Skin:     General: Skin is warm and dry.      Coloration: Skin is not pale.      Findings: No erythema or rash.   Neurological:      Mental Status: He is alert and oriented to person, place, and time.      Cranial Nerves: No cranial nerve deficit.   Psychiatric:         Behavior: Behavior normal.         Thought Content: Thought content normal.         Procedures           ED Course                                           Medical Decision Making  22-year-old with tick bite to the penis.    Tick bite of penis, initial encounter: acute illness or injury     Details: Very mild area of erythema no wound on the penis.  Patient given some topical triamcinolone ointment for relief.  Patient also started on doxycycline secondary to concern of tickborne illness.  Patient is not in any type of acute distress follow-up with PCP any other symptoms arise.  Risk  Prescription drug management.          Final diagnoses:   Tick bite of penis, initial encounter       ED Disposition  ED Disposition     ED Disposition   Discharge    Condition   Stable    Comment   --             Martin Liang PA  [address] [place] [address] [phone]    Schedule an appointment as soon as possible for a visit            Medication List      New Prescriptions    doxycycline 100 MG capsule  Commonly known as: MONODOX  Take 1 capsule by mouth 2 (Two) Times a Day.           Where to Get Your Medications      These medications were sent to [facility]  [place] - [address] [facility]  [phone]  - [phone] FX  [address] [facility] [address]    Phone: [phone]   · doxycycline 100 MG capsule          Eliseo Cadena II, PA  04/04/23 7419

## 2023-09-19 ENCOUNTER — OFFICE VISIT (OUTPATIENT)
Dept: PSYCHIATRY | Facility: CLINIC | Age: 23
End: 2023-09-19
Payer: COMMERCIAL

## 2023-09-19 DIAGNOSIS — F33.1 MAJOR DEPRESSIVE DISORDER, RECURRENT EPISODE, MODERATE: Primary | ICD-10-CM

## 2023-09-19 DIAGNOSIS — F41.1 GENERALIZED ANXIETY DISORDER: ICD-10-CM

## 2023-09-19 PROCEDURE — 90791 PSYCH DIAGNOSTIC EVALUATION: CPT | Performed by: COUNSELOR

## 2023-09-19 NOTE — PROGRESS NOTES
"Patient ID: Servando Kulkarni is a 23 y.o. male presenting to [facility] for assessment with ADENIKE Love, TARIQ.     Time: 9:45am  Name of PCP: [facility]   Referral source: PCP  Description of current emotional/behavioral concerns: Patient presents this date for initial evaluation for depression and anxiety.  Patient states that his parents  when he was 12 or 13 years old.  He also acknowledges that he spent most of his childhood with his grandmother due to his parents substance use.  He acknowledges that one of his parents \"snorted\" and the other parent frequently \"injected\".  He states that after his mother was arrested for PI, his grandmother obtained full custody of him around the time of his parents divorce.  He describes a poor relationship with his parents that he does not see often.  He acknowledges that his father was in senior living for 8 years and was only recently released that his father does come to visit other family members that comes exposes patient to him, he and his father make no intentional effort towards 1 another.    Patient states that in the 11th or 12th grade he began having difficulties with social anxieties and did not want to go into public environments.  He acknowledges that he has difficulty walking into stores and being in public places he feels as if he has a very small social friend group he has a difficult time making new friends and maintaining friendships.  However, patient is in a long-term relationship with a significant girlfriend.    Patient does acknowledge struggling with both depression and anxiety for the last few years.  He recognizes that he has difficulty with appropriate perception of social situations and feels nervous regarding numerous.  However, patient adamantly and convincingly denies current suicidal or homicidal ideation or perceptual disturbance.    Significant Life Events  Has patient been through " "or witnessed a divorce? yes  Parents  when pt was 12 or 13    Has patient experienced a death / loss of relationship? Yes  Poor relationship with parents; don't see them often     Father was in custodial for 8 years and recently released     Has patient experienced a major accident or tragic events? no    Has patient experienced any other significant life events or trauma (such as verbal, physical, sexual abuse)?   Mother and grandmother were neighbors; mostly raised by grandmother who finally obtained full custody at age 12 or 13 after mother was arrested for PI (both parents in substance use including snorting and injecting)     Work History  Highest level of education obtained: 12th grade    Ever been active duty in the ? 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    Interpersonal/Relational  Marital Status: not , but in a relationship  Patient's current living situation: with grandmother and aunt   Support system: significant other  Difficulty getting along with peers: yes, \"they don't like being my friend\"   Difficulty making new friendships: yes  Difficulty maintaining friendships: yes  Close with family members: no    Mental/Behavioral Health History  History of prior treatment or hospitalization: took therapy for a month or so as a child when his grandmother obtained custody around age 12 or 13; saw a therapist at [facility]     Are there any significant health issues (current or past): heart stroke and stroke 1 year ago     History of seizures: no    Family History   Problem Relation Age of Onset    Drug abuse Mother     Drug abuse Father     ADD / ADHD Brother     Anxiety disorder Brother     Diabetes Maternal Aunt     No Known Problems Maternal Grandfather     Lung cancer Maternal Grandmother     No Known Problems Paternal " Grandfather     No Known Problems Paternal Grandmother        Current Medications:   Current Outpatient Medications   Medication Sig Dispense Refill    Acetaminophen Extra Strength 500 MG tablet TAKE 1 OR 2 TABLETS BY MOUTH EVERY 4 TO 6 HOURS AS NEEDED **DO NOT EXCEED 8 TABLETS PER 24 HOURS**      cyclobenzaprine (FLEXERIL) 10 MG tablet Take 1 tablet by mouth Every Night. 30 tablet 11    docusate sodium (COLACE) 50 MG capsule Take  by mouth 2 (Two) Times a Day.      doxycycline (MONODOX) 100 MG capsule Take 1 capsule by mouth 2 (Two) Times a Day. 14 capsule 0    hydrOXYzine (ATARAX) 25 MG tablet Take 1 tablet by mouth 3 (Three) Times a Day As Needed for Itching.      linaclotide (LINZESS) 145 MCG capsule capsule Take 1 capsule by mouth Every Morning Before Breakfast. 30 capsule 11    omeprazole (priLOSEC) 40 MG capsule Take 1 capsule by mouth Daily As Needed (acid reflux). 30 capsule 11    prochlorperazine (COMPAZINE) 10 MG tablet Take 1 tablet by mouth Every 6 (Six) Hours As Needed for Nausea. 20 tablet 0     No current facility-administered medications for this visit.       History of Substance Use:   Patient denies any abuse / use of substances other than vaping / Delta 8.       PHQ-Score Total:  PHQ-9 Total Score: 16 out of 27   ELENO-7 Total Score: 18 out of 21     (Scales based on 0 - 10 with 10 being the worst)  Depression: 6 Anxiety: 8       SUICIDE RISK ASSESSMENT/CSSRS  1. Does patient have thoughts of suicide? no  2. Does patient have intent for suicide? no  3. Does patient have a current plan for suicide? no  4. History of suicide attempts: no  5. Family history of suicide or attempts: no  6. History of violent behaviors towards others or property or thoughts of committing suicide: no  7. History of sexual aggression toward others: no  8. Access to firearms or weapons: no    Mental Status Exam:   MENTAL STATUS EXAM   General Appearance:  Cleanly groomed and dressed  Eye Contact:  Fair  Attitude:   Cooperative  Motor Activity:  Normal gait, posture  Muscle Strength:  Normal  Speech:  Soft spoken and monotone  Language:  Spontaneous  Mood and affect:  Blunted and flat  Thought Process:  Logical and goal-directed  Associations/ Thought Content:  No delusions  Hallucinations:  None  Suicidal Ideations:  Not present  Homicidal Ideation:  Not present  Sensorium:  Alert  Orientation:  Person, place, time and situation  Immediate Recall, Recent, and Remote Memory:  Intact  Attention Span/ Concentration:  Good  Fund of Knowledge:  Appropriate for age and educational level  Intellectual Functioning:  Average range  Insight:  Fair  Judgement:  Fair  Reliability:  Good  Impulse Control:  Good     Impression/Formulation:    VISIT DIAGNOSIS:     ICD-10-CM ICD-9-CM   1. Major depressive disorder, recurrent episode, moderate  F33.1 296.32   2. Generalized anxiety disorder  F41.1 300.02        Patient appeared alert and oriented.  Patient is voluntarily requesting to begin outpatient therapy at [facility].  Patient is receptive to assistance with maintaining a stable lifestyle.  Patient presents with history of anxiety.  Patient is agreeable to attend routine therapy sessions.  Patient expressed desire to maintain stability and participate in the therapeutic process.        Crisis Plan:  Symptoms and/or behaviors to indicate a crisis: Excessive worry or fear    What calming techniques or other strategies will patient use to de-esclate and stay safe: slow down, breathe, visualize calming self, think it though, listen to music, change focus, take a walk    Who is one person patient can contact to assist with de-escalation? Girlfriend     If symptoms/behaviors persist, patient will present to the nearest hospital for an assessment. Advised patient of [facility] 24/7 assessment services.       Plan:   Obtain release of information for current treatment team for continuity of care.  Patient will  adhere to medication regimen as prescribed and report any side effects.   Patient will contact this office, call 911 or present to the nearest emergency room should suicidal or homicidal ideations occur.  Begin psychotherapy.    Recommended Referrals: none      This document has been electronically signed by Abhijeet Abdi, LPCC-S, [facility]  September 19, 2023 10:57 EDT      Part of this note may be an electronic transcription/translation of spoken language to printed text using the Dragon Dictation System.

## 2023-10-12 ENCOUNTER — OFFICE VISIT (OUTPATIENT)
Dept: PSYCHIATRY | Facility: CLINIC | Age: 23
End: 2023-10-12
Payer: COMMERCIAL

## 2023-10-12 DIAGNOSIS — F33.1 MAJOR DEPRESSIVE DISORDER, RECURRENT EPISODE, MODERATE: Primary | ICD-10-CM

## 2023-10-12 DIAGNOSIS — F41.1 GENERALIZED ANXIETY DISORDER: ICD-10-CM

## 2023-10-12 PROCEDURE — 90832 PSYTX W PT 30 MINUTES: CPT | Performed by: COUNSELOR

## 2023-10-12 NOTE — PROGRESS NOTES
Date: October 12, 2023  Time In: 12:40pm  Time Out: 1:13pm      PROGRESS NOTE  Data:  Servando Kulkarni is a 23 y.o. male who presents today for individual therapy session at [facility]. Patient presents this date for depression and anxiety.  Was associated with feeling stuck in his situation as he discusses some of his educational limitations and struggles with reading.  He discusses the various places of employment that he has had in which he is difficulties with reading have prevented him from being able to complete his job successfully.  Patient also discusses some of the negative self believes he has associated with lack of appropriate support system as he continues living with his grandparents who are frequently negative to him.  He states that he maintains very limited contact with both his biological parents and that his maternal grandparents as well as other family, are frequently negative to him and speak disrespectfully regarding his lack of future.  Patient works to redirect several negative self beliefs as well as seek opportunities in order to improve his overall situation due to the limitations that he currently has.      Clinical Maneuvering/Intervention:    (Scales based on 0 - 10 with 10 being the worst)  Depression: 7 Anxiety: 7       Assisted patient in processing above session content; acknowledged and normalized patient's thoughts, feelings, and concerns. Rationalized patient thought process regarding social anxieties. Discussed triggers associated with patient's depression and anxiety. Also discussed coping skills for patient to implement.    Allowed patient to freely discuss issues without interruption or judgment. Provided safe, confidential environment to facilitate the development of positive therapeutic relationship and encourage open, honest communication. Assisted patient in identifying risk factors which would indicate the need for higher level of care including  thoughts to harm self or others and/or self-harming behavior and encouraged patient to contact this office, call 911, or present to the nearest emergency room should any of these events occur. Discussed crisis intervention services and means to access. Patient adamantly and convincingly denies current suicidal or homicidal ideation or perceptual disturbance.    Assessment   Patient appears to maintain relative stability as compared to their baseline. However, patient continues to struggle with depression and anxiety which continues to cause impairment in important areas of functioning. As a result, they can be reasonably expected to continue to benefit from treatment and would likely be at increased risk for decompensation otherwise.    Mental Status Exam:   MENTAL STATUS EXAM   General Appearance:  Cleanly groomed and dressed  Eye Contact:  Good eye contact  Attitude:  Cooperative  Motor Activity:  Normal gait, posture  Muscle Strength:  Normal  Speech:  Normal rate, tone, volume  Language:  Spontaneous  Thought Process:  Goal-directed and linear  Associations/ Thought Content:  No delusions  Hallucinations:  None  Suicidal Ideations:  Not present  Homicidal Ideation:  Not present  Sensorium:  Alert  Orientation:  Place, time, situation and person  Immediate Recall, Recent, and Remote Memory:  Deficit noted  Attention Span/ Concentration:  Good  Fund of Knowledge:  Appropriate for age and educational level  Intellectual Functioning:  Average range  Insight:  Fair  Judgement:  Fair  Reliability:  Fair  Impulse Control:  Good       Patient's Support Network Includes:  significant other and grandparents    Functional Status: Moderate impairment     Progress toward goal: Not at goal    Prognosis: Fair with Ongoing Treatment          Plan     Patient will continue in individual outpatient therapy with focus on improved functioning and coping skills, maintaining stability, and avoiding decompensation and the need for  higher level of care.    Patient will adhere to any medication regimens as prescribed and report any side effects. Patient will contact this office, call 911 or present to the nearest emergency room should suicidal or homicidal ideations occur. Provide cognitive behavioral therapy and solution focused therapy to improve functioning, maintain stability and avoid decompensation and the need for higher level of care.     Return in about 4 weeks, or earlier if symptoms worsen or fail to improve.           VISIT DIAGNOSIS:     ICD-10-CM ICD-9-CM   1. Major depressive disorder, recurrent episode, moderate  F33.1 296.32   2. Generalized anxiety disorder  F41.1 300.02            This document has been electronically signed by Abhijeet Abdi, LPCC-S, [facility]  October 12, 2023 13:15 EDT      Part of this note may be an electronic transcription/translation of spoken language to printed text using the Dragon Dictation System.

## 2025-07-15 ENCOUNTER — TRANSCRIBE ORDERS (OUTPATIENT)
Dept: ADMINISTRATIVE | Facility: HOSPITAL | Age: 25
End: 2025-07-15
Payer: COMMERCIAL

## 2025-07-15 ENCOUNTER — HOSPITAL ENCOUNTER (OUTPATIENT)
Dept: GENERAL RADIOLOGY | Facility: HOSPITAL | Age: 25
Discharge: HOME | End: 2025-07-15
Admitting: STUDENT IN AN ORGANIZED HEALTH CARE EDUCATION/TRAINING PROGRAM
Payer: COMMERCIAL

## 2025-07-15 DIAGNOSIS — M54.50 LOW BACK PAIN, UNSPECIFIED BACK PAIN LATERALITY, UNSPECIFIED CHRONICITY, UNSPECIFIED WHETHER SCIATICA PRESENT: Primary | ICD-10-CM

## 2025-07-15 DIAGNOSIS — M54.50 LOW BACK PAIN, UNSPECIFIED BACK PAIN LATERALITY, UNSPECIFIED CHRONICITY, UNSPECIFIED WHETHER SCIATICA PRESENT: ICD-10-CM

## 2025-07-15 PROCEDURE — 74018 RADEX ABDOMEN 1 VIEW: CPT | Performed by: RADIOLOGY

## 2025-07-15 PROCEDURE — 74018 RADEX ABDOMEN 1 VIEW: CPT
